# Patient Record
Sex: FEMALE | Race: WHITE | NOT HISPANIC OR LATINO | URBAN - METROPOLITAN AREA
[De-identification: names, ages, dates, MRNs, and addresses within clinical notes are randomized per-mention and may not be internally consistent; named-entity substitution may affect disease eponyms.]

---

## 2021-09-06 PROCEDURE — U0004 COV-19 TEST NON-CDC HGH THRU: HCPCS | Performed by: FAMILY MEDICINE

## 2021-09-07 ENCOUNTER — TELEPHONE (OUTPATIENT)
Dept: URGENT CARE | Facility: CLINIC | Age: 41
End: 2021-09-07

## 2024-10-16 ENCOUNTER — OFFICE VISIT (OUTPATIENT)
Dept: NEUROSURGERY | Facility: CLINIC | Age: 44
End: 2024-10-16
Payer: COMMERCIAL

## 2024-10-16 VITALS — WEIGHT: 163.2 LBS | TEMPERATURE: 97.4 F | HEIGHT: 62 IN | BODY MASS INDEX: 30.03 KG/M2

## 2024-10-16 DIAGNOSIS — Z72.0 TOBACCO ABUSE: ICD-10-CM

## 2024-10-16 DIAGNOSIS — M51.9 LUMBAR DISC DISEASE: ICD-10-CM

## 2024-10-16 DIAGNOSIS — M54.16 LUMBAR RADICULOPATHY: Primary | ICD-10-CM

## 2024-10-16 PROCEDURE — 99203 OFFICE O/P NEW LOW 30 MIN: CPT | Performed by: NEUROLOGICAL SURGERY

## 2024-10-16 RX ORDER — ATORVASTATIN CALCIUM 40 MG/1
TABLET, FILM COATED ORAL
COMMUNITY
Start: 2024-09-10

## 2024-10-16 RX ORDER — ONDANSETRON 4 MG/1
TABLET, ORALLY DISINTEGRATING ORAL
COMMUNITY
Start: 2024-10-15

## 2024-10-16 RX ORDER — ACYCLOVIR 400 MG/1
TABLET ORAL
COMMUNITY
Start: 2024-09-16

## 2024-10-16 RX ORDER — POLYETHYLENE GLYCOL 3350 17 G/DOSE
POWDER (GRAM) ORAL
COMMUNITY
Start: 2024-06-28

## 2024-10-16 RX ORDER — LISINOPRIL/HYDROCHLOROTHIAZIDE 10-12.5 MG
1 TABLET ORAL DAILY
COMMUNITY
Start: 2024-10-04

## 2024-10-16 RX ORDER — INSULIN GLARGINE 100 [IU]/ML
INJECTION, SOLUTION SUBCUTANEOUS
COMMUNITY
Start: 2024-07-22

## 2024-10-16 RX ORDER — SEMAGLUTIDE 2.68 MG/ML
INJECTION, SOLUTION SUBCUTANEOUS
COMMUNITY
Start: 2024-09-27

## 2024-10-16 RX ORDER — PEN NEEDLE, DIABETIC 31 GX5/16"
NEEDLE, DISPOSABLE MISCELLANEOUS
COMMUNITY
Start: 2024-09-12

## 2024-10-16 RX ORDER — OLANZAPINE 10 MG/1
1 TABLET ORAL DAILY
COMMUNITY
Start: 2024-09-03

## 2024-10-16 RX ORDER — SALICYLIC ACID 40 %
1 ADHESIVE PATCH, MEDICATED TOPICAL DAILY
COMMUNITY
Start: 2024-09-16

## 2024-10-16 NOTE — PROGRESS NOTES
Patient: Rona Singh  : 1980    Primary Care Provider: Penny Daigle APRN    Requesting Provider: As above        History    Chief Complaint: Right leg pain with sensory alteration.    History of Present Illness: Ms. Singh is a 44-year-old  in  underwent left L5-S1 discectomy by Dr. Pabon.  She did very well.  The last year she has had pain involving the right buttock that extends into the anterior, lateral, medial aspects of the thigh.  She has numbness somewhat globally below the right knee.  She has little in the way of back pain.  She has no left leg symptoms.  Symptoms are worse with walking or standing.  Her job entails walking many miles a day.  She is better sitting down.  She smokes 1.5 packs of tobacco daily.  She did physical therapy through the months of May and .  She does take Suboxone.    Review of Systems   Constitutional:  Positive for activity change. Negative for appetite change, chills, diaphoresis, fatigue, fever and unexpected weight change.   HENT:  Negative for congestion, dental problem, drooling, ear discharge, ear pain, facial swelling, hearing loss, mouth sores, nosebleeds, postnasal drip, rhinorrhea, sinus pressure, sinus pain, sneezing, sore throat, tinnitus, trouble swallowing and voice change.    Eyes:  Negative for photophobia, pain, discharge, redness, itching and visual disturbance.   Respiratory:  Negative for apnea, cough, choking, chest tightness, shortness of breath, wheezing and stridor.    Cardiovascular:  Negative for chest pain, palpitations and leg swelling.   Gastrointestinal:  Negative for abdominal distention, abdominal pain, anal bleeding, blood in stool, constipation, diarrhea, nausea, rectal pain and vomiting.   Endocrine: Negative for cold intolerance, heat intolerance, polydipsia, polyphagia and polyuria.   Genitourinary:  Negative for decreased urine volume, difficulty urinating, dyspareunia, dysuria, enuresis, flank pain,  "frequency, genital sores, hematuria, menstrual problem, pelvic pain, urgency, vaginal bleeding, vaginal discharge and vaginal pain.   Musculoskeletal:  Positive for back pain. Negative for arthralgias, gait problem, joint swelling, myalgias, neck pain and neck stiffness.   Skin:  Negative for color change, pallor, rash and wound.   Allergic/Immunologic: Negative for environmental allergies, food allergies and immunocompromised state.   Neurological:  Positive for numbness. Negative for dizziness, tremors, seizures, syncope, facial asymmetry, speech difficulty, weakness, light-headedness and headaches.   Hematological:  Negative for adenopathy. Does not bruise/bleed easily.   Psychiatric/Behavioral:  Positive for sleep disturbance. Negative for agitation, behavioral problems, confusion, decreased concentration, dysphoric mood, hallucinations, self-injury and suicidal ideas. The patient is not nervous/anxious and is not hyperactive.        The patient's past medical history, past surgical history, family history, and social history have been reviewed at length in the electronic medical record.      Physical Exam:   Temp 97.4 °F (36.3 °C) (Infrared)   Ht 157.5 cm (62\")   Wt 74 kg (163 lb 3.2 oz)   BMI 29.85 kg/m²   CONSTITUTIONAL: Patient is well-nourished, pleasant and appears stated age.  MUSCULOSKELETAL:  Straight leg raising is negative.  Moses's Sign is negative.  ROM in the low back is normal.  Tenderness in the back to palpation is not observed.  Well-healed prior midline lumbosacral incision is noted.  NEUROLOGICAL:  Orientation, memory, attention span, language function, and cognition have been examined and are intact.  Strength is intact in the lower extremities to direct testing.  Muscle tone is normal throughout.  Station and gait are normal.  Sensation is intact to light touch testing throughout.  Deep tendon reflexes are difficult to elicit throughout.  Coordination is intact.      Medical Decision " Making    Data Review:   (All imaging studies were personally reviewed unless stated otherwise)  MRI of the lumbar spine dated 7/22/2024 demonstrates loss of height at L5-S1 with chronic Modic endplate changes.  Laminotomy defect is noted on the left at that level.  There is some central broad-based disc protrusion at L4-5 that narrows both recesses.    Diagnosis:   The patient could harbor a radiculopathy potentially emanating from the L4-5 level although her symptoms are not completely concordant.    Treatment Options:   She is not interested in medicines such as gabapentin given her use of Suboxone.  She does not like needles and as such she is not keen on injections.  As such, I am going to make arrangements for a lumbar CT myelogram as well as electrodiagnostic studies of her right lower extremity.  Based on that we will see if surgical treatment is an option.  I have discussed the merits of smoking cessation as it relates to her degenerative spinal changes.      Scribed for Sj Todd MD by Janna Chadwick CMA on 10/16/2024 08:59 EDT       I, Dr. Todd, personally performed the services described in the documentation, as scribed in my presence, and it is both accurate and complete.

## 2024-10-25 ENCOUNTER — TELEPHONE (OUTPATIENT)
Dept: INFUSION THERAPY | Facility: HOSPITAL | Age: 44
End: 2024-10-25

## 2024-10-25 NOTE — TELEPHONE ENCOUNTER
LVM for patient as pre-procedure call prior to planned (myelogram/lumbar puncture) scheduled for (). Reviewed with patient arrival time, location,  needed, blood thinners, nothing to eat after midnight but clear liquids okay, may take medications the morning of the procedure but use caution with hypoglycemic medications until after allowed to eat. If procedure scheduled for afternoon okay to eat a light breakfast prior to 8 am. Plan on being here for procedure 4-5 hours so wear comfortable clothes. Reviewed medical history, medications, allergies and allowed time for questions.

## 2024-10-29 ENCOUNTER — HOSPITAL ENCOUNTER (OUTPATIENT)
Dept: GENERAL RADIOLOGY | Facility: HOSPITAL | Age: 44
Discharge: HOME | End: 2024-10-29
Payer: COMMERCIAL

## 2024-10-29 ENCOUNTER — HOSPITAL ENCOUNTER (OUTPATIENT)
Dept: CT IMAGING | Facility: HOSPITAL | Age: 44
Discharge: HOME | End: 2024-10-29
Payer: COMMERCIAL

## 2024-10-29 ENCOUNTER — HOSPITAL ENCOUNTER (OUTPATIENT)
Dept: NEUROLOGY | Facility: HOSPITAL | Age: 44
Discharge: HOME | End: 2024-10-29
Payer: COMMERCIAL

## 2024-10-29 VITALS
OXYGEN SATURATION: 94 % | HEART RATE: 89 BPM | TEMPERATURE: 97 F | DIASTOLIC BLOOD PRESSURE: 83 MMHG | RESPIRATION RATE: 18 BRPM | WEIGHT: 158.2 LBS | SYSTOLIC BLOOD PRESSURE: 136 MMHG | BODY MASS INDEX: 29.11 KG/M2 | HEIGHT: 62 IN

## 2024-10-29 DIAGNOSIS — M54.16 LUMBAR RADICULOPATHY: ICD-10-CM

## 2024-10-29 LAB — B-HCG UR QL: NEGATIVE

## 2024-10-29 PROCEDURE — 72120 X-RAY BEND ONLY L-S SPINE: CPT

## 2024-10-29 PROCEDURE — 72132 CT LUMBAR SPINE W/DYE: CPT

## 2024-10-29 PROCEDURE — 81025 URINE PREGNANCY TEST: CPT | Performed by: NEUROLOGICAL SURGERY

## 2024-10-29 PROCEDURE — 72240 MYELOGRAPHY NECK SPINE: CPT

## 2024-10-29 PROCEDURE — 25510000001 IOPAMIDOL 41 % SOLUTION: Performed by: NEUROLOGICAL SURGERY

## 2024-10-29 PROCEDURE — 95886 MUSC TEST DONE W/N TEST COMP: CPT

## 2024-10-29 PROCEDURE — 62304 MYELOGRAPHY LUMBAR INJECTION: CPT

## 2024-10-29 PROCEDURE — 95909 NRV CNDJ TST 5-6 STUDIES: CPT

## 2024-10-29 PROCEDURE — 25010000002 LIDOCAINE 1 % SOLUTION: Performed by: NEUROLOGICAL SURGERY

## 2024-10-29 RX ORDER — PROMETHAZINE HYDROCHLORIDE 25 MG/1
25 TABLET ORAL ONCE AS NEEDED
Status: DISCONTINUED | OUTPATIENT
Start: 2024-10-29 | End: 2024-10-30 | Stop reason: HOSPADM

## 2024-10-29 RX ORDER — LIDOCAINE HYDROCHLORIDE 10 MG/ML
5 INJECTION, SOLUTION INFILTRATION; PERINEURAL ONCE
Status: COMPLETED | OUTPATIENT
Start: 2024-10-29 | End: 2024-10-29

## 2024-10-29 RX ORDER — ONDANSETRON 4 MG/1
4 TABLET, ORALLY DISINTEGRATING ORAL ONCE AS NEEDED
Status: DISCONTINUED | OUTPATIENT
Start: 2024-10-29 | End: 2024-10-30 | Stop reason: HOSPADM

## 2024-10-29 RX ORDER — IOPAMIDOL 408 MG/ML
20 INJECTION, SOLUTION INTRATHECAL
Status: COMPLETED | OUTPATIENT
Start: 2024-10-29 | End: 2024-10-29

## 2024-10-29 RX ADMIN — LIDOCAINE HYDROCHLORIDE 5 ML: 10 INJECTION, SOLUTION INFILTRATION; PERINEURAL at 07:49

## 2024-10-29 RX ADMIN — IOPAMIDOL 20 ML: 408 INJECTION, SOLUTION INTRATHECAL at 07:49

## 2024-10-29 NOTE — NURSING NOTE
Pt discharged from ir dept s/p myelogram. Pt tolerated procedure without complications. Discharge instructions reviewed with patient and significant other, both verbalized understanding. Strongly encouraged pt to drink plenty of fluids over next 48 hours as she wasn't drinking very well during recovery stay. She reports that she will drink plenty when she gets home and can drink her isabella yello. [place] [facility]

## 2024-10-29 NOTE — POST-PROCEDURE NOTE
MYELOGRAM PROCEDURE NOTE  Neurosurgery    Patient: Rona Singh  : 1980      PreOp Diagnosis: Lumbar radiculpathy    PostOp Diagnosis: Same    Procedure: Lumbar myelogram    Surgeon: Perez    Anesthesia: 1% lidocaine    Technique:   Spinal needle: 22 gauge   Contrast: Isovue 200 (20ml)   Injection site: L3    EBL: Trace    Specimens removed: None    Complication: None        Sj Todd MD  10/29/24  07:38 EDT

## 2024-10-30 ENCOUNTER — TELEPHONE (OUTPATIENT)
Dept: INFUSION THERAPY | Facility: HOSPITAL | Age: 44
End: 2024-10-30
Payer: COMMERCIAL

## 2024-11-01 ENCOUNTER — OFFICE VISIT (OUTPATIENT)
Dept: NEUROSURGERY | Facility: CLINIC | Age: 44
End: 2024-11-01
Payer: COMMERCIAL

## 2024-11-01 VITALS — HEIGHT: 62 IN | WEIGHT: 155 LBS | BODY MASS INDEX: 28.52 KG/M2 | TEMPERATURE: 97.1 F

## 2024-11-01 DIAGNOSIS — M51.9 LUMBAR DISC DISEASE: ICD-10-CM

## 2024-11-01 DIAGNOSIS — M54.16 LUMBAR RADICULAR PAIN: Primary | ICD-10-CM

## 2024-11-01 PROCEDURE — 99213 OFFICE O/P EST LOW 20 MIN: CPT | Performed by: NEUROLOGICAL SURGERY

## 2024-11-01 RX ORDER — LANCETS
EACH MISCELLANEOUS
COMMUNITY
Start: 2024-10-23

## 2024-11-01 RX ORDER — BLOOD-GLUCOSE METER
EACH MISCELLANEOUS
COMMUNITY
Start: 2024-10-23

## 2024-11-01 NOTE — PROGRESS NOTES
Patient: Rona Singh  : 1980    Primary Care Provider: Penny Daigle APRN    Requesting Provider: As above        History    Chief Complaint: Right leg pain with sensory alteration.    History of Present Illness: Ms. Singh is a 44-year-old  in  underwent left L5-S1 discectomy by Dr. Pabon.  She did very well.  The last year she has had pain involving the right buttock that extends into the anterior, lateral, medial aspects of the thigh.  She has numbness somewhat globally below the right knee.  She has little in the way of back pain.  She has no left leg symptoms.  Symptoms are worse with walking or standing.  Her job entails walking many miles a day.  She is better sitting down.  She smokes 1.5 packs of tobacco daily.  She did physical therapy through the months of May and .  She does take Suboxone.  At this point the pain is in her right medial and lateral thigh.  It skips her calf and shin.  She has some numbness in her left foot.  Earlier in the week she had a myelogram.  She does have some mild positional headache still.    Review of Systems   Constitutional:  Negative for activity change, appetite change, chills, diaphoresis, fatigue, fever and unexpected weight change.   HENT:  Negative for congestion, dental problem, drooling, ear discharge, ear pain, facial swelling, hearing loss, mouth sores, nosebleeds, postnasal drip, rhinorrhea, sinus pressure, sinus pain, sneezing, sore throat, tinnitus, trouble swallowing and voice change.    Eyes:  Negative for photophobia, pain, discharge, redness, itching and visual disturbance.   Respiratory:  Negative for apnea, cough, choking, chest tightness, shortness of breath, wheezing and stridor.    Cardiovascular:  Negative for chest pain, palpitations and leg swelling.   Gastrointestinal:  Negative for abdominal distention, abdominal pain, anal bleeding, blood in stool, constipation, diarrhea, nausea, rectal pain and vomiting.  "  Endocrine: Negative for cold intolerance, heat intolerance, polydipsia, polyphagia and polyuria.   Genitourinary:  Negative for decreased urine volume, difficulty urinating, dyspareunia, dysuria, enuresis, flank pain, frequency, genital sores, hematuria, menstrual problem, pelvic pain, urgency, vaginal bleeding, vaginal discharge and vaginal pain.   Musculoskeletal:  Positive for back pain. Negative for arthralgias, gait problem, joint swelling, myalgias, neck pain and neck stiffness.   Skin:  Negative for color change, pallor, rash and wound.   Allergic/Immunologic: Negative for environmental allergies, food allergies and immunocompromised state.   Neurological:  Positive for headaches. Negative for dizziness, tremors, seizures, syncope, facial asymmetry, speech difficulty, weakness, light-headedness and numbness.   Hematological:  Negative for adenopathy. Does not bruise/bleed easily.   Psychiatric/Behavioral:  Negative for agitation, behavioral problems, confusion, decreased concentration, dysphoric mood, hallucinations, self-injury, sleep disturbance and suicidal ideas. The patient is not nervous/anxious and is not hyperactive.        The patient's past medical history, past surgical history, family history, and social history have been reviewed at length in the electronic medical record.      Physical Exam:   Temp 97.1 °F (36.2 °C) (Infrared)   Ht 157.5 cm (62\")   Wt 70.3 kg (155 lb)   BMI 28.35 kg/m²   Deferred    Medical Decision Making    Data Review:   (All imaging studies were personally reviewed unless stated otherwise)  MRI of the lumbar spine dated 7/22/2024 demonstrates loss of height at L5-S1 with chronic Modic endplate changes. Laminotomy defect is noted on the left at that level. There is some central broad-based disc protrusion at L4-5 that narrows both recesses.     Electrodiagnostic studies of her right lower extremity were normal.    Lumbar CT myelogram demonstrates a laminotomy defect on " the left at L5-S1.  There are some degenerative changes.  I do not see nerve root compromise at any level.    Diagnosis:   The patient has right leg symptoms that do not really fit into a specific dermatome.  Her studies do not demonstrate a clear nerve root issue.    Treatment Options:   At this juncture, treatment will need to remain symptomatic.  There is no role for neurosurgical intervention.  She will follow-up with neurosurgery on an as-needed basis.      Scribed for Sj Todd MD by Janna Chadwick CMA on 11/1/2024 08:07 EDT ]      I, Dr. Todd, personally performed the services described in the documentation, as scribed in my presence, and it is both accurate and complete.

## 2025-04-12 ENCOUNTER — HOSPITAL ENCOUNTER (EMERGENCY)
Facility: HOSPITAL | Age: 45
Discharge: HOME | End: 2025-04-12
Attending: EMERGENCY MEDICINE
Payer: COMMERCIAL

## 2025-04-12 ENCOUNTER — APPOINTMENT (OUTPATIENT)
Dept: CT IMAGING | Facility: HOSPITAL | Age: 45
End: 2025-04-12
Payer: COMMERCIAL

## 2025-04-12 VITALS
HEART RATE: 89 BPM | WEIGHT: 155 LBS | TEMPERATURE: 97.8 F | RESPIRATION RATE: 16 BRPM | DIASTOLIC BLOOD PRESSURE: 62 MMHG | BODY MASS INDEX: 28.52 KG/M2 | SYSTOLIC BLOOD PRESSURE: 107 MMHG | HEIGHT: 62 IN | OXYGEN SATURATION: 94 %

## 2025-04-12 DIAGNOSIS — R79.89 PSEUDOHYPONATREMIA: ICD-10-CM

## 2025-04-12 DIAGNOSIS — R73.9 HYPERGLYCEMIA: Primary | ICD-10-CM

## 2025-04-12 LAB
ALBUMIN SERPL-MCNC: 4.3 G/DL (ref 3.5–5.2)
ALBUMIN/GLOB SERPL: 1.4 G/DL
ALP SERPL-CCNC: 181 U/L (ref 39–117)
ALT SERPL W P-5'-P-CCNC: 6 U/L (ref 1–33)
ANION GAP SERPL CALCULATED.3IONS-SCNC: 13 MMOL/L (ref 5–15)
AST SERPL-CCNC: 7 U/L (ref 1–32)
BASOPHILS # BLD AUTO: 0.05 10*3/MM3 (ref 0–0.2)
BASOPHILS NFR BLD AUTO: 0.4 % (ref 0–1.5)
BILIRUB SERPL-MCNC: 0.3 MG/DL (ref 0–1.2)
BILIRUB UR QL STRIP: NEGATIVE
BUN SERPL-MCNC: 17 MG/DL (ref 6–20)
BUN/CREAT SERPL: 18.1 (ref 7–25)
CALCIUM SPEC-SCNC: 9.6 MG/DL (ref 8.6–10.5)
CHLORIDE SERPL-SCNC: 79 MMOL/L (ref 98–107)
CLARITY UR: CLEAR
CO2 SERPL-SCNC: 27 MMOL/L (ref 22–29)
COLOR UR: YELLOW
CREAT SERPL-MCNC: 0.94 MG/DL (ref 0.57–1)
DEPRECATED RDW RBC AUTO: 38.4 FL (ref 37–54)
DEVELOPER EXPIRATION DATE: NORMAL
DEVELOPER LOT NUMBER: NORMAL
EGFRCR SERPLBLD CKD-EPI 2021: 75.9 ML/MIN/1.73
EOSINOPHIL # BLD AUTO: 0.1 10*3/MM3 (ref 0–0.4)
EOSINOPHIL NFR BLD AUTO: 0.8 % (ref 0.3–6.2)
ERYTHROCYTE [DISTWIDTH] IN BLOOD BY AUTOMATED COUNT: 13.8 % (ref 12.3–15.4)
EXPIRATION DATE: NORMAL
FECAL OCCULT BLOOD SCREEN, POC: NEGATIVE
GLOBULIN UR ELPH-MCNC: 3 GM/DL
GLUCOSE BLDC GLUCOMTR-MCNC: 407 MG/DL (ref 70–130)
GLUCOSE SERPL-MCNC: 794 MG/DL (ref 65–99)
GLUCOSE UR STRIP-MCNC: ABNORMAL MG/DL
HCT VFR BLD AUTO: 44.2 % (ref 34–46.6)
HGB BLD-MCNC: 15 G/DL (ref 12–15.9)
HGB UR QL STRIP.AUTO: NEGATIVE
IMM GRANULOCYTES # BLD AUTO: 0.06 10*3/MM3 (ref 0–0.05)
IMM GRANULOCYTES NFR BLD AUTO: 0.5 % (ref 0–0.5)
KETONES UR QL STRIP: NEGATIVE
LEUKOCYTE ESTERASE UR QL STRIP.AUTO: NEGATIVE
LYMPHOCYTES # BLD AUTO: 3.22 10*3/MM3 (ref 0.7–3.1)
LYMPHOCYTES NFR BLD AUTO: 27.2 % (ref 19.6–45.3)
Lab: NORMAL
MAGNESIUM SERPL-MCNC: 1.6 MG/DL (ref 1.6–2.6)
MCH RBC QN AUTO: 26.2 PG (ref 26.6–33)
MCHC RBC AUTO-ENTMCNC: 33.9 G/DL (ref 31.5–35.7)
MCV RBC AUTO: 77.1 FL (ref 79–97)
MONOCYTES # BLD AUTO: 0.4 10*3/MM3 (ref 0.1–0.9)
MONOCYTES NFR BLD AUTO: 3.4 % (ref 5–12)
NEGATIVE CONTROL: NEGATIVE
NEUTROPHILS NFR BLD AUTO: 67.7 % (ref 42.7–76)
NEUTROPHILS NFR BLD AUTO: 8.02 10*3/MM3 (ref 1.7–7)
NITRITE UR QL STRIP: NEGATIVE
NRBC BLD AUTO-RTO: 0 /100 WBC (ref 0–0.2)
PH UR STRIP.AUTO: 6 [PH] (ref 5–8)
PLATELET # BLD AUTO: 374 10*3/MM3 (ref 140–450)
PMV BLD AUTO: 9.9 FL (ref 6–12)
POSITIVE CONTROL: POSITIVE
POTASSIUM SERPL-SCNC: 4.2 MMOL/L (ref 3.5–5.2)
PROT SERPL-MCNC: 7.3 G/DL (ref 6–8.5)
PROT UR QL STRIP: NEGATIVE
RBC # BLD AUTO: 5.73 10*6/MM3 (ref 3.77–5.28)
SODIUM SERPL-SCNC: 119 MMOL/L (ref 136–145)
SP GR UR STRIP: 1.02 (ref 1–1.03)
UROBILINOGEN UR QL STRIP: ABNORMAL
WBC NRBC COR # BLD AUTO: 11.85 10*3/MM3 (ref 3.4–10.8)

## 2025-04-12 PROCEDURE — 63710000001 INSULIN REGULAR HUMAN PER 5 UNITS: Performed by: EMERGENCY MEDICINE

## 2025-04-12 PROCEDURE — 85025 COMPLETE CBC W/AUTO DIFF WBC: CPT | Performed by: EMERGENCY MEDICINE

## 2025-04-12 PROCEDURE — 25810000003 SODIUM CHLORIDE 0.9 % SOLUTION: Performed by: EMERGENCY MEDICINE

## 2025-04-12 PROCEDURE — 81003 URINALYSIS AUTO W/O SCOPE: CPT | Performed by: EMERGENCY MEDICINE

## 2025-04-12 PROCEDURE — 36415 COLL VENOUS BLD VENIPUNCTURE: CPT

## 2025-04-12 PROCEDURE — 82948 REAGENT STRIP/BLOOD GLUCOSE: CPT

## 2025-04-12 PROCEDURE — 99285 EMERGENCY DEPT VISIT HI MDM: CPT

## 2025-04-12 PROCEDURE — 74177 CT ABD & PELVIS W/CONTRAST: CPT

## 2025-04-12 PROCEDURE — 83735 ASSAY OF MAGNESIUM: CPT | Performed by: EMERGENCY MEDICINE

## 2025-04-12 PROCEDURE — 82270 OCCULT BLOOD FECES: CPT | Performed by: EMERGENCY MEDICINE

## 2025-04-12 PROCEDURE — 25510000001 IOPAMIDOL 61 % SOLUTION: Performed by: EMERGENCY MEDICINE

## 2025-04-12 PROCEDURE — 80053 COMPREHEN METABOLIC PANEL: CPT | Performed by: EMERGENCY MEDICINE

## 2025-04-12 RX ORDER — POTASSIUM CHLORIDE 1.5 G/1.58G
40 POWDER, FOR SOLUTION ORAL ONCE
Status: COMPLETED | OUTPATIENT
Start: 2025-04-12 | End: 2025-04-12

## 2025-04-12 RX ORDER — SODIUM CHLORIDE 0.9 % (FLUSH) 0.9 %
10 SYRINGE (ML) INJECTION AS NEEDED
Status: DISCONTINUED | OUTPATIENT
Start: 2025-04-12 | End: 2025-04-13 | Stop reason: HOSPADM

## 2025-04-12 RX ORDER — IOPAMIDOL 612 MG/ML
85 INJECTION, SOLUTION INTRAVASCULAR
Status: COMPLETED | OUTPATIENT
Start: 2025-04-12 | End: 2025-04-12

## 2025-04-12 RX ADMIN — IOPAMIDOL 80 ML: 612 INJECTION, SOLUTION INTRAVENOUS at 21:38

## 2025-04-12 RX ADMIN — INSULIN HUMAN 10 UNITS: 100 INJECTION, SOLUTION PARENTERAL at 21:47

## 2025-04-12 RX ADMIN — SODIUM CHLORIDE 1000 ML: 9 INJECTION, SOLUTION INTRAVENOUS at 20:50

## 2025-04-12 RX ADMIN — POTASSIUM CHLORIDE 40 MEQ: 1.5 FOR SOLUTION ORAL at 21:47

## 2025-04-13 NOTE — ED PROVIDER NOTES
"Subjective   History of Present Illness  45-year-old female presents for evaluation of multiple complaints.  The patient tells me that for the past year or so she has had stool \"coming out of her vagina.\"  However, she notes that this has increased in frequency over the past 3 months or so.  Additionally, she notes that secondary to insurance reasons, she recently was forced to stop her Ozempic and despite compliance with her other diabetes medications has consistently had elevated blood sugar readings as of late.  She notes that her blood sugar is consistently in the 500-600 range.  Given her many symptoms, she came here to the ED to be evaluated.  She denies any vaginal bleeding.  She denies any bloody stools.  She denies any abdominal pain.      Review of Systems   Genitourinary:         \"Stool coming out of vagina\"   All other systems reviewed and are negative.      Past Medical History:   Diagnosis Date   • Arthritis     wrists and lower spine   • Diabetes mellitus 05/2021   • Hyperlipidemia    • Hypertension    • Incontinence of feces    • Incontinence of urine    • Injury of back        Allergies   Allergen Reactions   • Amoxicillin Hives   • Codeine Nausea And Vomiting   • Dicyclomine Nausea Only   • Penicillins Hives   • Acetaminophen-Codeine Other (See Comments)     causes vomiting  and fever   • Tramadol Other (See Comments)     palpitations       Past Surgical History:   Procedure Laterality Date   • ABDOMINAL SURGERY      \"mass removed from pelvis area-female area\"   • ADENOIDECTOMY     • APPENDECTOMY     • BACK SURGERY Left 2008    L5-S1 Lumbar discectomy -Dr. Pabon   • BREAST SURGERY      reduction   • DILATATION AND CURETTAGE      x2   • TONSILLECTOMY         No family history on file.    Social History     Socioeconomic History   • Marital status: Single   Tobacco Use   • Smoking status: Every Day     Current packs/day: 1.00     Types: Cigarettes     Passive exposure: Current   • Smokeless tobacco: " Never   Vaping Use   • Vaping status: Never Used   Substance and Sexual Activity   • Alcohol use: Never   • Drug use: Not Currently   • Sexual activity: Defer           Objective   Physical Exam  Vitals and nursing note reviewed.   Constitutional:       General: She is not in acute distress.     Appearance: Normal appearance. She is well-developed. She is not diaphoretic.      Comments: Nontoxic-appearing female   HENT:      Head: Normocephalic and atraumatic.   Eyes:      Pupils: Pupils are equal, round, and reactive to light.      Comments: Exotropic right eye   Cardiovascular:      Rate and Rhythm: Normal rate and regular rhythm.      Heart sounds: Normal heart sounds. No murmur heard.     No friction rub. No gallop.   Pulmonary:      Effort: Pulmonary effort is normal. No respiratory distress.      Breath sounds: Normal breath sounds. No wheezing or rales.   Abdominal:      General: Bowel sounds are normal. There is no distension.      Palpations: Abdomen is soft. There is no mass.      Tenderness: There is no abdominal tenderness. There is no guarding or rebound.      Comments: No peritoneal signs, no pain out of proportion to exam, no focal abdominal tenderness noted   Genitourinary:     Comments: No CVA tenderness present, unremarkable rectal exam, no bright red blood per rectum, unremarkable pelvic exam, no visible blood or stool noted within vaginal vault, no vaginal discharge present  Musculoskeletal:         General: Normal range of motion.      Cervical back: Neck supple.   Skin:     General: Skin is warm and dry.      Findings: No erythema or rash.   Neurological:      Mental Status: She is alert and oriented to person, place, and time.      Comments: Awake, alert, and oriented x3, clear and fluent speech, no ataxia or dysmetria, normal gait, neurovascularly intact distally in all fours with bounding distal pulses and normal sensation, 5 out of 5 strength in all fours   Psychiatric:         Mood and  Affect: Mood normal.         Thought Content: Thought content normal.         Judgment: Judgment normal.         Procedures           ED Course  ED Course as of 04/13/25 0059   Sat Apr 12, 2025 2008 45-year-old female presents for evaluation of multiple complaints.  The patient tells me that for the past year or so she has had stool coming out of her vagina.  However, she notes that this has increased in frequency over the past 3 months.  Additionally, she notes that secondary to insurance reasons, she recently was forced to stop her Ozempic and despite compliance with her other diabetes medications has consistently had elevated blood sugar readings.  Given her many symptoms, she came here for evaluation this evening.  On arrival, the patient is nontoxic-appearing.  Nonsurgical abdomen.  Broad differential diagnosis.  We will obtain labs and imaging and will reassess following initial interventions.  Additionally, we will take the patient to the pelvic room for a formal pelvic and rectal exam. [DD]   2034 Fecal occult blood test is negative.  Rectal exam was unremarkable.  Pelvic exam was unremarkable and no stool or blood was noted within the patient's vaginal vault. [DD]   2057 Labs remarkable for glucose of 794 and sodium of 119.  The patient's hyponatremia appears more consistent with pseudohyponatremia as her calculated sodium should be approximately 123-124.  She is currently mentating well. [DD]   2059 Labs are not consistent with DKA.  IV insulin given with concomitant oral potassium. [DD]   2209 I personally and independently reviewed the patient's CT images and findings, and I am in agreement with the radiologist regarding CT interpretation--particularly there is no evidence of fistula noted.  No emergent/surgical intra-abdominal process present. [DD]   2248 Repeat glucose is downtrending at 407. [DD]   2248 Upon reevaluation, the patient looks and feels improved.  Given her well appearance and overall  "clinical picture, feel that she can be safely discharged and managed on an outpatient basis.  We discussed dietary modifications to help her with her significant hyperglycemia.  She will follow-up with her primary care physician within the next week.  I also advised her to follow-up with her established OB/GYN regarding [DD]   2249  her reported \"stool from the vagina\" as soon as possible.  No emergent/surgical intra-abdominal process noted.  No fistula noted. [DD]   2249 Agreeable with plan and given appropriate strict return precautions. [DD]      ED Course User Index  [DD] Jim Robles MD                                           Recent Results (from the past 24 hours)   Comprehensive Metabolic Panel    Collection Time: 04/12/25  8:13 PM    Specimen: Blood   Result Value Ref Range    Glucose 794 (C) 65 - 99 mg/dL    BUN 17 6 - 20 mg/dL    Creatinine 0.94 0.57 - 1.00 mg/dL    Sodium 119 (C) 136 - 145 mmol/L    Potassium 4.2 3.5 - 5.2 mmol/L    Chloride 79 (L) 98 - 107 mmol/L    CO2 27.0 22.0 - 29.0 mmol/L    Calcium 9.6 8.6 - 10.5 mg/dL    Total Protein 7.3 6.0 - 8.5 g/dL    Albumin 4.3 3.5 - 5.2 g/dL    ALT (SGPT) 6 1 - 33 U/L    AST (SGOT) 7 1 - 32 U/L    Alkaline Phosphatase 181 (H) 39 - 117 U/L    Total Bilirubin 0.3 0.0 - 1.2 mg/dL    Globulin 3.0 gm/dL    A/G Ratio 1.4 g/dL    BUN/Creatinine Ratio 18.1 7.0 - 25.0    Anion Gap 13.0 5.0 - 15.0 mmol/L    eGFR 75.9 >60.0 mL/min/1.73   Magnesium    Collection Time: 04/12/25  8:13 PM    Specimen: Blood   Result Value Ref Range    Magnesium 1.6 1.6 - 2.6 mg/dL   CBC Auto Differential    Collection Time: 04/12/25  8:13 PM    Specimen: Blood   Result Value Ref Range    WBC 11.85 (H) 3.40 - 10.80 10*3/mm3    RBC 5.73 (H) 3.77 - 5.28 10*6/mm3    Hemoglobin 15.0 12.0 - 15.9 g/dL    Hematocrit 44.2 34.0 - 46.6 %    MCV 77.1 (L) 79.0 - 97.0 fL    MCH 26.2 (L) 26.6 - 33.0 pg    MCHC 33.9 31.5 - 35.7 g/dL    RDW 13.8 12.3 - 15.4 %    RDW-SD 38.4 37.0 - 54.0 fl    " MPV 9.9 6.0 - 12.0 fL    Platelets 374 140 - 450 10*3/mm3    Neutrophil % 67.7 42.7 - 76.0 %    Lymphocyte % 27.2 19.6 - 45.3 %    Monocyte % 3.4 (L) 5.0 - 12.0 %    Eosinophil % 0.8 0.3 - 6.2 %    Basophil % 0.4 0.0 - 1.5 %    Immature Grans % 0.5 0.0 - 0.5 %    Neutrophils, Absolute 8.02 (H) 1.70 - 7.00 10*3/mm3    Lymphocytes, Absolute 3.22 (H) 0.70 - 3.10 10*3/mm3    Monocytes, Absolute 0.40 0.10 - 0.90 10*3/mm3    Eosinophils, Absolute 0.10 0.00 - 0.40 10*3/mm3    Basophils, Absolute 0.05 0.00 - 0.20 10*3/mm3    Immature Grans, Absolute 0.06 (H) 0.00 - 0.05 10*3/mm3    nRBC 0.0 0.0 - 0.2 /100 WBC   Urinalysis With Culture If Indicated - Urine, Clean Catch    Collection Time: 04/12/25  8:14 PM    Specimen: Urine, Clean Catch   Result Value Ref Range    Color, UA Yellow Yellow, Straw    Appearance, UA Clear Clear    pH, UA 6.0 5.0 - 8.0    Specific Gravity, UA 1.024 1.001 - 1.030    Glucose, UA >=1000 mg/dL (3+) (A) Negative    Ketones, UA Negative Negative    Bilirubin, UA Negative Negative    Blood, UA Negative Negative    Protein, UA Negative Negative    Leuk Esterase, UA Negative Negative    Nitrite, UA Negative Negative    Urobilinogen, UA 0.2 E.U./dL 0.2 - 1.0 E.U./dL   POC Occult Blood Stool    Collection Time: 04/12/25  8:41 PM    Specimen: Per Rectum; Stool   Result Value Ref Range    Fecal Occult Blood Negative     Lot Number [id number]     Expiration Date 4/27     DEVELOPER LOT NUMBER [id number]     DEVELOPER EXPIRATION DATE 12/2,027     Positive Control Positive     Negative Control Negative    POC Glucose Once    Collection Time: 04/12/25 10:45 PM    Specimen: Blood   Result Value Ref Range    Glucose 407 (C) 70 - 130 mg/dL     Note: In addition to lab results from this visit, the labs listed above may include labs taken at another facility or during a different encounter within the last 24 hours. Please correlate lab times with ED admission and discharge times for further clarification of the services  performed during this visit.    CT Abdomen Pelvis With Contrast   Final Result   Impression:   1.No acute intra-abdominal or intrapelvic process. No evidence of fistula.   2.Hepatic steatosis.   3.Ancillary findings as described above.                  Electronically Signed: Ivanna Gomez MD     4/12/2025 9:53 PM EDT     Workstation ID: [id number]        Vitals:    04/12/25 2100 04/12/25 2129 04/12/25 2130 04/12/25 2300   BP: 107/62      Pulse: 81 74 75 89   Resp:       Temp:       TempSrc:       SpO2: 90% 93%  94%   Weight:       Height:         Medications   sodium chloride 0.9 % flush 10 mL (has no administration in time range)   sodium chloride 0.9 % bolus 1,000 mL (0 mL Intravenous Stopped 4/12/25 2307)   insulin regular (humuLIN R,novoLIN R) injection 10 Units (10 Units Intravenous Given 4/12/25 2147)   potassium chloride (KLOR-CON) packet 40 mEq (40 mEq Oral Given 4/12/25 2147)   iopamidol (ISOVUE-300) 61 % injection 85 mL (80 mL Intravenous Given 4/12/25 2138)     ECG/EMG Results (last 24 hours)       ** No results found for the last 24 hours. **          No orders to display           Recent Results (from the past 24 hours)   Comprehensive Metabolic Panel    Collection Time: 04/12/25  8:13 PM    Specimen: Blood   Result Value Ref Range    Glucose 794 (C) 65 - 99 mg/dL    BUN 17 6 - 20 mg/dL    Creatinine 0.94 0.57 - 1.00 mg/dL    Sodium 119 (C) 136 - 145 mmol/L    Potassium 4.2 3.5 - 5.2 mmol/L    Chloride 79 (L) 98 - 107 mmol/L    CO2 27.0 22.0 - 29.0 mmol/L    Calcium 9.6 8.6 - 10.5 mg/dL    Total Protein 7.3 6.0 - 8.5 g/dL    Albumin 4.3 3.5 - 5.2 g/dL    ALT (SGPT) 6 1 - 33 U/L    AST (SGOT) 7 1 - 32 U/L    Alkaline Phosphatase 181 (H) 39 - 117 U/L    Total Bilirubin 0.3 0.0 - 1.2 mg/dL    Globulin 3.0 gm/dL    A/G Ratio 1.4 g/dL    BUN/Creatinine Ratio 18.1 7.0 - 25.0    Anion Gap 13.0 5.0 - 15.0 mmol/L    eGFR 75.9 >60.0 mL/min/1.73   Magnesium    Collection Time: 04/12/25  8:13 PM    Specimen: Blood    Result Value Ref Range    Magnesium 1.6 1.6 - 2.6 mg/dL   CBC Auto Differential    Collection Time: 04/12/25  8:13 PM    Specimen: Blood   Result Value Ref Range    WBC 11.85 (H) 3.40 - 10.80 10*3/mm3    RBC 5.73 (H) 3.77 - 5.28 10*6/mm3    Hemoglobin 15.0 12.0 - 15.9 g/dL    Hematocrit 44.2 34.0 - 46.6 %    MCV 77.1 (L) 79.0 - 97.0 fL    MCH 26.2 (L) 26.6 - 33.0 pg    MCHC 33.9 31.5 - 35.7 g/dL    RDW 13.8 12.3 - 15.4 %    RDW-SD 38.4 37.0 - 54.0 fl    MPV 9.9 6.0 - 12.0 fL    Platelets 374 140 - 450 10*3/mm3    Neutrophil % 67.7 42.7 - 76.0 %    Lymphocyte % 27.2 19.6 - 45.3 %    Monocyte % 3.4 (L) 5.0 - 12.0 %    Eosinophil % 0.8 0.3 - 6.2 %    Basophil % 0.4 0.0 - 1.5 %    Immature Grans % 0.5 0.0 - 0.5 %    Neutrophils, Absolute 8.02 (H) 1.70 - 7.00 10*3/mm3    Lymphocytes, Absolute 3.22 (H) 0.70 - 3.10 10*3/mm3    Monocytes, Absolute 0.40 0.10 - 0.90 10*3/mm3    Eosinophils, Absolute 0.10 0.00 - 0.40 10*3/mm3    Basophils, Absolute 0.05 0.00 - 0.20 10*3/mm3    Immature Grans, Absolute 0.06 (H) 0.00 - 0.05 10*3/mm3    nRBC 0.0 0.0 - 0.2 /100 WBC   Urinalysis With Culture If Indicated - Urine, Clean Catch    Collection Time: 04/12/25  8:14 PM    Specimen: Urine, Clean Catch   Result Value Ref Range    Color, UA Yellow Yellow, Straw    Appearance, UA Clear Clear    pH, UA 6.0 5.0 - 8.0    Specific Gravity, UA 1.024 1.001 - 1.030    Glucose, UA >=1000 mg/dL (3+) (A) Negative    Ketones, UA Negative Negative    Bilirubin, UA Negative Negative    Blood, UA Negative Negative    Protein, UA Negative Negative    Leuk Esterase, UA Negative Negative    Nitrite, UA Negative Negative    Urobilinogen, UA 0.2 E.U./dL 0.2 - 1.0 E.U./dL   POC Occult Blood Stool    Collection Time: 04/12/25  8:41 PM    Specimen: Per Rectum; Stool   Result Value Ref Range    Fecal Occult Blood Negative     Lot Number [id number]     Expiration Date 4/27     DEVELOPER LOT NUMBER [id number]     DEVELOPER EXPIRATION DATE 12/2,027     Positive Control  Positive     Negative Control Negative    POC Glucose Once    Collection Time: 04/12/25 10:45 PM    Specimen: Blood   Result Value Ref Range    Glucose 407 (C) 70 - 130 mg/dL     Note: In addition to lab results from this visit, the labs listed above may include labs taken at another facility or during a different encounter within the last 24 hours. Please correlate lab times with ED admission and discharge times for further clarification of the services performed during this visit.    CT Abdomen Pelvis With Contrast   Final Result   Impression:   1.No acute intra-abdominal or intrapelvic process. No evidence of fistula.   2.Hepatic steatosis.   3.Ancillary findings as described above.                  Electronically Signed: Ivanna Gomez MD     4/12/2025 9:53 PM EDT     Workstation ID: [id number]        Vitals:    04/12/25 2100 04/12/25 2129 04/12/25 2130 04/12/25 2300   BP: 107/62      Pulse: 81 74 75 89   Resp:       Temp:       TempSrc:       SpO2: 90% 93%  94%   Weight:       Height:         Medications   sodium chloride 0.9 % flush 10 mL (has no administration in time range)   sodium chloride 0.9 % bolus 1,000 mL (0 mL Intravenous Stopped 4/12/25 2307)   insulin regular (humuLIN R,novoLIN R) injection 10 Units (10 Units Intravenous Given 4/12/25 2147)   potassium chloride (KLOR-CON) packet 40 mEq (40 mEq Oral Given 4/12/25 2147)   iopamidol (ISOVUE-300) 61 % injection 85 mL (80 mL Intravenous Given 4/12/25 2138)     ECG/EMG Results (last 24 hours)       ** No results found for the last 24 hours. **          No orders to display                 Medical Decision Making  Problems Addressed:  Hyperglycemia: complicated acute illness or injury  Pseudohyponatremia: complicated acute illness or injury    Amount and/or Complexity of Data Reviewed  Labs: ordered.  Radiology: ordered.    Risk  OTC drugs.  Prescription drug management.        Final diagnoses:   Hyperglycemia   Pseudohyponatremia       ED Disposition  ED  Disposition       ED Disposition   Discharge    Condition   Stable    Comment   --               Penny Daigle, APRN  [address] [place] [address] [phone]    In 1 week           Medication List      No changes were made to your prescriptions during this visit.            Jim Robles MD  04/13/25 0059

## 2025-07-02 RX ORDER — SODIUM, POTASSIUM,MAG SULFATES 17.5-3.13G
SOLUTION, RECONSTITUTED, ORAL ORAL
Qty: 354 ML | Refills: 0 | Status: SHIPPED | OUTPATIENT
Start: 2025-07-02

## 2025-08-22 RX ORDER — SODIUM, POTASSIUM,MAG SULFATES 17.5-3.13G
SOLUTION, RECONSTITUTED, ORAL ORAL
Qty: 354 ML | Refills: 0 | Status: SHIPPED | OUTPATIENT
Start: 2025-08-22